# Patient Record
Sex: FEMALE | Race: WHITE
[De-identification: names, ages, dates, MRNs, and addresses within clinical notes are randomized per-mention and may not be internally consistent; named-entity substitution may affect disease eponyms.]

---

## 2020-08-21 ENCOUNTER — HOSPITAL ENCOUNTER (OUTPATIENT)
Dept: HOSPITAL 61 - KCIC CT | Age: 70
Discharge: HOME | End: 2020-08-21
Attending: UROLOGY
Payer: MEDICARE

## 2020-08-21 DIAGNOSIS — R91.1: ICD-10-CM

## 2020-08-21 DIAGNOSIS — J84.9: ICD-10-CM

## 2020-08-21 DIAGNOSIS — I70.0: ICD-10-CM

## 2020-08-21 DIAGNOSIS — K76.89: ICD-10-CM

## 2020-08-21 DIAGNOSIS — N28.1: ICD-10-CM

## 2020-08-21 DIAGNOSIS — C64.2: Primary | ICD-10-CM

## 2020-08-21 PROCEDURE — 71046 X-RAY EXAM CHEST 2 VIEWS: CPT

## 2020-08-21 PROCEDURE — 74178 CT ABD&PLV WO CNTR FLWD CNTR: CPT

## 2020-08-21 PROCEDURE — 82565 ASSAY OF CREATININE: CPT

## 2020-08-21 NOTE — [facility]
EXAM: CT ABDOMEN/PELVIS WITH AND WITHOUT CONTRAST.

 

HISTORY: Left renal cancer status post partial nephrectomy.

 

TECHNIQUE: Computed tomography of the abdomen and pelvis was performed 

before and after the intravenous administration of iodinated contrast. One

or more of the following individualized dose reduction techniques were 

utilized for this examination:  

1. Automated exposure control.  

2. Adjustment of the mA and/or kV according to patient size.  

3. Use of iterative reconstruction technique.

 

COMPARISON: 07/05/2018, 05/10/2017.

 

FINDINGS: Lung windows through the visualized portions of the bases reveal

a 4 mm nodule in the left costophrenic angle, stable since 2018 and likely

benign. Subpleural interstitial opacities are consistent with atelectasis 

or mild interstitial lung disease. Bone windows reveal no suspicious 

lesions.

 

Partial nephrectomy changes are noted laterally along the left renal 

interpolar region. There is no evidence of recurrence at this site. 

Multiple dense nonenhancing nodules in both kidneys are consistent with 

proteinaceous/hemorrhagic cysts. Multiple subcentimeter typical cysts are 

seen elsewhere. There are no suspicious lesions bilaterally.

 

There is a 5 mm cyst in hepatic segment 2. Another in segment 5 measures 

11 mm. The pancreas, adrenal glands, gallbladder and spleen are 

unremarkable. There are no pathologically enlarged lymph nodes.

 

The uterus is surgically absent. There is no small bowel obstruction. 

There is no evidence of appendicitis.

 

IMPRESSION: 

1. No evidence of recurrence status post left partial nephrectomy.

2. Multiple bilateral renal masses are consistent with simple and 

hemorrhagic cyst. No suspicious renal lesions.

3. Correlate for mild interstitial lung disease in the bases.

 

Electronically signed by: WESLY Valencia MD (8/21/2020 3:15 PM) 

[id number]

## 2020-08-21 NOTE — [facility]
EXAM: CHEST 2 VIEWS.

 

HISTORY: Renal cell carcinoma.

 

COMPARISON: None.

 

FINDINGS: Frontal and lateral views of the chest are obtained.

 

Linear opacities in the left greater than right bases most likely indicate

atelectasis or scarring. There are no suspicious nodules by radiographs. 

There is no pneumothorax or pleural effusion. The heart is not enlarged. 

There are atherosclerotic calcifications of the aorta. There is a mild 

thoracic dextroscoliosis. 

 

IMPRESSION:

1. No evidence of metastatic disease in the chest. CT is more sensitive if

there is persistent concern.

 

Electronically signed by: WESLY Valencia MD (8/21/2020 2:16 PM) 

[id number]

## 2021-03-01 ENCOUNTER — HOSPITAL ENCOUNTER (OUTPATIENT)
Dept: HOSPITAL 61 - KCIC MRI | Age: 71
End: 2021-03-01
Attending: INTERNAL MEDICINE
Payer: MEDICARE

## 2021-03-01 DIAGNOSIS — M16.11: Primary | ICD-10-CM

## 2021-03-01 PROCEDURE — 73721 MRI JNT OF LWR EXTRE W/O DYE: CPT

## 2021-03-01 NOTE — [facility]
EXAMINATION: MRI RIGHT HIP WITHOUT IV CONTRAST



CLINICAL HISTORY: Right hip pain since fall in December 2020



TECHNIQUE: Multiplanar multisequential images obtained through the hip without intravenous contrast.



COMPARISON: None





FINDINGS:  



Right Hip: Subchondral marrow reactive/cystic changes in the posterior superior humeral head, compati
ble with overlying full-thickness chondral fissuring/loss. Degenerative tearing in the anterior super
ior acetabular labrum. No acute fracture. No avascular necrosis. Small joint effusion. No synovitis.



Left Hip: No acute fracture. No avascular necrosis. Small joint effusion. Large field of view images 
limits evaluation of labrum and cartilage.



Sacroiliac Joints: Within normal limits.



Pubic Symphysis: Within normal limits.



Tendons: Right gluteal insertional tendinosis, moderate to marked in the gluteus medius and mild in t
he gluteus minimus tendons. Tendons otherwise within normal limits including the right iliopsoas, ham
string, gluteus bradley, and rectus femoris tendons.



Muscles: Within normal limits.



Bones/Marrow: No acute fracture or suspicious marrow replacing process. Partially visualized lumbar d
egenerative changes.



Other: Mild thickening and fluid in the right trochanteric bursa which can be seen with bursitis in t
he appropriate clinical setting.





IMPRESSION:  



Mild degenerative changes right hip.



Right gluteal insertional tendinosis, moderate to marked in the gluteus medius tendon.



Electronically signed by: Behzad Garcia DO (3/1/2021 2:14 PM) [id number]

## 2021-08-12 ENCOUNTER — HOSPITAL ENCOUNTER (OUTPATIENT)
Dept: HOSPITAL 61 - KCIC US | Age: 71
End: 2021-08-12
Attending: UROLOGY
Payer: MEDICARE

## 2021-08-12 DIAGNOSIS — C64.2: Primary | ICD-10-CM

## 2021-08-12 DIAGNOSIS — I70.0: ICD-10-CM

## 2021-08-12 DIAGNOSIS — N28.89: ICD-10-CM

## 2021-08-12 DIAGNOSIS — M41.85: ICD-10-CM

## 2021-08-12 DIAGNOSIS — Z90.5: ICD-10-CM

## 2021-08-12 DIAGNOSIS — N28.1: ICD-10-CM

## 2021-08-12 PROCEDURE — 71046 X-RAY EXAM CHEST 2 VIEWS: CPT

## 2021-08-12 PROCEDURE — 76770 US EXAM ABDO BACK WALL COMP: CPT

## 2021-08-12 NOTE — [facility]
EXAM: RENAL/RETROPERITONAL ULTRASOUND.



HISTORY: Renal cell carcinoma status post left partial nephrectomy.



COMPARISON: 08/21/2020.



FINDINGS: Ultrasound of the kidneys, bladder and retroperitoneum was performed.



The right kidney measures 10.5 cm. Cortical thickness and echogenicity are preserved. There is no hyd
ronephrosis. A 10 x 9 mm circumscribed nodule in the right interpolar region contains some internal e
choes. A 17 x 15 mm cyst at the lower pole appears simple. There is no internal perfusion at either l
esion. 



The left kidney measures 9.8 cm. Cortical thickness and echogenicity are preserved. There is no hydro
nephrosis. A cyst in the left renal upper pole appears to contain a thin septation and measures 16 x 
15 mm. Another at the lower pole appears more simple and measures 11 x 10 mm.



Images of the bladder reveal no gross abnormality. The abdominal aorta and inferior vena cava are jean carlos
ssly patent and normal in caliber.



IMPRESSION:

1. Bilateral renal masses most likely reflect simple or complicated cysts. Some smaller cysts noted o
n prior CT are not detected currently. No solid or perfusion lesions are identified. 



Electronically signed by: WESLY Valencia MD (8/12/2021 4:56 PM) [id number]

## 2021-08-12 NOTE — [facility]
EXAM: CHEST 2 VIEWS.



HISTORY: Renal cell carcinoma.



COMPARISON: 08/21/2020.



FINDINGS: Frontal and lateral views of the chest are obtained.



Linear opacities in left base are chronic and likely reflects scarring. There are no suspicious pulmo
nary nodules by radiographs. There is no pneumothorax or pleural effusion. The heart is not enlarged.




A 1.2 cm calcification projecting anteriorly in the abdomen on the lateral projection may be a gallst
one but is indeterminate. There are atherosclerotic calcifications of the aorta. There is mild S-shap
ed thoracolumbar scoliosis.



IMPRESSION:

1. No evidence of metastatic disease by radiographs. CT is more sensitive if there is persistent conc
adriel.

2. Correlate for cholelithiasis.



Electronically signed by: WESLY Valencia MD (8/12/2021 4:52 PM) [id number]